# Patient Record
Sex: FEMALE | Race: WHITE | NOT HISPANIC OR LATINO | Employment: OTHER | URBAN - METROPOLITAN AREA
[De-identification: names, ages, dates, MRNs, and addresses within clinical notes are randomized per-mention and may not be internally consistent; named-entity substitution may affect disease eponyms.]

---

## 2022-03-16 ENCOUNTER — HOSPITAL ENCOUNTER (OUTPATIENT)
Dept: RADIOLOGY | Facility: OTHER | Age: 62
Discharge: HOME | End: 2022-03-16
Attending: NURSE PRACTITIONER
Payer: MEDICARE

## 2022-03-16 DIAGNOSIS — J44.9 CHRONIC OBSTRUCTIVE PULMONARY DISEASE, UNSPECIFIED: ICD-10-CM

## 2022-03-16 PROCEDURE — 71046 X-RAY EXAM CHEST 2 VIEWS: CPT | Mod: 26,,, | Performed by: RADIOLOGY

## 2022-03-16 PROCEDURE — 71046 X-RAY EXAM CHEST 2 VIEWS: CPT | Mod: TC,FY

## 2022-03-16 PROCEDURE — 71046 XR CHEST PA AND LATERAL: ICD-10-PCS | Mod: 26,,, | Performed by: RADIOLOGY

## 2022-03-29 ENCOUNTER — OFFICE VISIT (OUTPATIENT)
Dept: PODIATRY | Facility: CLINIC | Age: 62
End: 2022-03-29
Payer: MEDICARE

## 2022-03-29 VITALS
HEIGHT: 65 IN | DIASTOLIC BLOOD PRESSURE: 88 MMHG | WEIGHT: 180 LBS | SYSTOLIC BLOOD PRESSURE: 136 MMHG | BODY MASS INDEX: 29.99 KG/M2 | HEART RATE: 97 BPM

## 2022-03-29 DIAGNOSIS — B35.1 DERMATOPHYTOSIS OF NAIL: Primary | ICD-10-CM

## 2022-03-29 DIAGNOSIS — M20.5X2 OVERLAPPING TOE, ACQUIRED, LEFT: ICD-10-CM

## 2022-03-29 PROCEDURE — 99999 PR PBB SHADOW E&M-EST. PATIENT-LVL III: ICD-10-PCS | Mod: PBBFAC,,, | Performed by: PODIATRIST

## 2022-03-29 PROCEDURE — 99213 OFFICE O/P EST LOW 20 MIN: CPT | Mod: PBBFAC,PN | Performed by: PODIATRIST

## 2022-03-29 PROCEDURE — 99203 PR OFFICE/OUTPT VISIT, NEW, LEVL III, 30-44 MIN: ICD-10-PCS | Mod: S$PBB,,, | Performed by: PODIATRIST

## 2022-03-29 PROCEDURE — 99203 OFFICE O/P NEW LOW 30 MIN: CPT | Mod: S$PBB,,, | Performed by: PODIATRIST

## 2022-03-29 PROCEDURE — 99999 PR PBB SHADOW E&M-EST. PATIENT-LVL III: CPT | Mod: PBBFAC,,, | Performed by: PODIATRIST

## 2022-03-29 RX ORDER — PRENATAL VIT 91/IRON/FOLIC/DHA 28-975-200
COMBINATION PACKAGE (EA) ORAL 2 TIMES DAILY
Qty: 15 G | Refills: 3 | Status: SHIPPED | OUTPATIENT
Start: 2022-03-29

## 2022-03-29 NOTE — PATIENT INSTRUCTIONS
General nail care measures for abnormal nails include:  Keeping nails trimmed short, but avoid overzealous trimming  Avoiding trauma   Avoiding contact irritants   Keeping nails dry (avoiding wet work)  Avoiding all nail cosmetics  Wearing shoes that fit well at the toe box.  Avoid tight shoes.

## 2022-03-29 NOTE — PROGRESS NOTES
"Chief Complaint   Patient presents with    Ingrown Toenail     Ingrown toenail, right foot         MEDICAL DECISION MAKING:       Problem List Items Addressed This Visit    None     Visit Diagnoses     Dermatophytosis of nail    -  Primary    Relevant Medications    terbinafine HCL (LAMISIL) 1 % cream    Overlapping toe, acquired, left                · I counseled the patient on the patient's conditions, their implications and medical management.   · Toe  to keep 2nd toe from overlapping hallux.  Shoe and activity modification as needed for relief.   General nail care measures for abnormal nails include:   Keeping nails trimmed short, but avoid overzealous trimming   Avoiding trauma    Avoiding contact irritants    Keeping nails dry (avoiding wet work)   Avoiding all nail cosmetics   Wearing shoes that fit well at the toe box.  Avoid tight shoes.                   HPI:   Ina Valdes is a 62 y.o. female who presents to clinic with concerns of left hallux fungal, incurvated toenails, present for at least 10 years.  She has tried OTC topical antifungal without significant relief.          There is no problem list on file for this patient.          No current outpatient medications on file prior to visit.     No current facility-administered medications on file prior to visit.            Review of patient's allergies indicates:  Not on File                ROS:    General ROS: negative for - chills, fatigue or fever  Cardiovascular ROS: no chest pain or dyspnea on exertion  Musculoskeletal ROS: negative for - joint pain or joint stiffness   Skin: Negative for rash, Positive for nail or hair changes.  no itching.       EXAM:   Vitals:    03/29/22 1015   BP: 136/88   Pulse: 97   Weight: 81.6 kg (180 lb)   Height: 5' 5" (1.651 m)        General:  alert, no distress, cooperative    Vasc:    Pedal pulses: DP 2/4 - B/L and PT 2/4 - B/L  Capillary Refill Time: Normal - B/L  Temperature: Normal - " B/L  Hair Growth: Normal - B/L  Edema:  Absent bilaterally      Neuro Motor:   Kiel present bilaterally,   Reflexes normal bilaterally,   Tinels absent  Mulders absent      Derm:   Hair:  present  Nails: thickened and yellow, incurvated bilateral borders left hallux toenail, with subungual debris.  The left 2nd toe overlaps on the lateral border of the left hallux nail.   Interdigital Spaces: clean, dry and without evidence of break in skin integrity  Wounds: None        Msk:    tenderness absent    masses absent  range of movement non-painful and equal  crepitation absent bilaterally, strength normal 5/5  Mild left foot bunion,  left 2nd toe overlaps hallux.

## 2022-04-27 ENCOUNTER — HOSPITAL ENCOUNTER (OUTPATIENT)
Dept: RADIOLOGY | Facility: OTHER | Age: 62
Discharge: HOME | End: 2022-04-27
Attending: NURSE PRACTITIONER
Payer: MEDICARE

## 2022-04-27 DIAGNOSIS — Z12.31 ENCOUNTER FOR SCREENING MAMMOGRAM FOR MALIGNANT NEOPLASM OF BREAST: ICD-10-CM

## 2022-04-27 PROCEDURE — 77063 MAMMO DIGITAL SCREENING BILAT WITH TOMO: ICD-10-PCS | Mod: 26,,, | Performed by: RADIOLOGY

## 2022-04-27 PROCEDURE — 77067 SCR MAMMO BI INCL CAD: CPT | Mod: TC

## 2022-04-27 PROCEDURE — 77063 BREAST TOMOSYNTHESIS BI: CPT | Mod: 26,,, | Performed by: RADIOLOGY

## 2022-04-27 PROCEDURE — 77067 MAMMO DIGITAL SCREENING BILAT WITH TOMO: ICD-10-PCS | Mod: 26,,, | Performed by: RADIOLOGY

## 2022-04-27 PROCEDURE — 77067 SCR MAMMO BI INCL CAD: CPT | Mod: 26,,, | Performed by: RADIOLOGY

## 2022-10-03 ENCOUNTER — OFFICE VISIT (OUTPATIENT)
Dept: OBSTETRICS AND GYNECOLOGY | Facility: CLINIC | Age: 62
End: 2022-10-03
Payer: MEDICARE

## 2022-10-03 VITALS
HEIGHT: 65 IN | SYSTOLIC BLOOD PRESSURE: 108 MMHG | DIASTOLIC BLOOD PRESSURE: 62 MMHG | WEIGHT: 174 LBS | BODY MASS INDEX: 28.99 KG/M2

## 2022-10-03 DIAGNOSIS — Z11.51 SCREENING FOR HPV (HUMAN PAPILLOMAVIRUS): ICD-10-CM

## 2022-10-03 DIAGNOSIS — Z12.4 ENCOUNTER FOR PAPANICOLAOU SMEAR FOR CERVICAL CANCER SCREENING: Primary | ICD-10-CM

## 2022-10-03 PROCEDURE — 99999 PR PBB SHADOW E&M-EST. PATIENT-LVL III: CPT | Mod: PBBFAC,,, | Performed by: REGISTERED NURSE

## 2022-10-03 PROCEDURE — G0101 PR CA SCREEN;PELVIC/BREAST EXAM: ICD-10-PCS | Mod: S$PBB,GZ,, | Performed by: REGISTERED NURSE

## 2022-10-03 PROCEDURE — 99999 PR PBB SHADOW E&M-EST. PATIENT-LVL III: ICD-10-PCS | Mod: PBBFAC,,, | Performed by: REGISTERED NURSE

## 2022-10-03 PROCEDURE — 99213 OFFICE O/P EST LOW 20 MIN: CPT | Mod: PBBFAC | Performed by: REGISTERED NURSE

## 2022-10-03 PROCEDURE — 87624 HPV HI-RISK TYP POOLED RSLT: CPT | Performed by: REGISTERED NURSE

## 2022-10-03 PROCEDURE — G0101 CA SCREEN;PELVIC/BREAST EXAM: HCPCS | Mod: S$PBB,GZ,, | Performed by: REGISTERED NURSE

## 2022-10-03 PROCEDURE — 88175 CYTOPATH C/V AUTO FLUID REDO: CPT | Performed by: REGISTERED NURSE

## 2022-10-03 NOTE — PROGRESS NOTES
CC: Annual    HPI: Pt is a 62 y.o.  female who presents for routine annual exam. She is s/p hysterectomy. She had partial hysterectomy due to cervical cancer diagnosed in her 20's. She was referred from her primary care provider for pap due to this. She does not want STD screening; she has not been sexually active in years.  Denies any GYN complaints.  The patient participates in regular exercise: yes, dog walking.  The patient smokes (1/2 to 1 pack per day).  The patient wears seatbelts.   Pt denies any domestic violence.    Last mammogram: 04/2022 WNL  Last colonoscopy: 2 years ago, in [place]     FH:  Breast cancer: none  Colon cancer: none  Ovarian cancer: none  Endometrial cancer: none      ROS:  GENERAL: Feeling well overall.   SKIN: Denies rash or lesions.   HEAD: Denies head injury or headache.   NODES: Denies enlarged lymph nodes.   CHEST: Denies chest pain or shortness of breath.   CARDIOVASCULAR: Denies palpitations or left sided chest pain.   ABDOMEN: No abdominal pain, nausea, vomiting or rectal bleeding.   URINARY: No dysuria or hematuria.  REPRODUCTIVE: See HPI.   BREASTS: Denies pain, lumps, or nipple discharge.   HEMATOLOGIC: No easy bruisability or excessive bleeding.   MUSCULOSKELETAL: Denies joint pain or swelling.   NEUROLOGIC: Denies syncope or weakness.   PSYCHIATRIC: Denies depression.    PE:    APPEARANCE: Well nourished, well developed, in no acute distress.  NODES: No inguinal lymph node enlargement.  ABDOMEN: Soft. No tenderness or masses. No hernias.  BREASTS: Symmetrical, no skin changes or visible lesions. No palpable masses, nipple discharge or adenopathy bilaterally.  PELVIC: Normal external female genitalia without lesions. Normal hair distribution. Adequate perineal body, normal urethral meatus. Vagina without lesions or discharge. No significant cystocele or rectocele. Uterus and cervix surgically absent. Bimanual exam revealed no masses, tenderness or abnormality.  ANUS:  Normal.    Diagnosis:  1. Encounter for Papanicolaou smear for cervical cancer screening    2. Screening for HPV (human papillomavirus)          PLAN:  Pap/HPV co-testing of cervical cuff  Repeat mammogram in 1 year  Repeat colonoscopy as indicated    Patient was counseled today on postmenopausal issues.     Follow-up in 1 year.         HUDSON Bowman       Answers submitted by the patient for this visit:  Gynecologic Exam Questionnaire  (Submitted on 9/29/2022)  Chief Complaint: Gynecologic exam  Pregnant now?: No  abdominal pain: Yes  Vaginal bleeding: no bleeding  Passing clots?: No  Sexual activity: not sexually active  Partner with STD symptoms: no  Birth control: nothing  gynecological surgery: Yes

## 2022-10-07 LAB
CLINICAL INFO: NORMAL
CYTO CVX: NORMAL
CYTOLOGIST CVX/VAG CYTO: NORMAL
CYTOLOGIST CVX/VAG CYTO: NORMAL
CYTOLOGY CMNT CVX/VAG CYTO-IMP: NORMAL
CYTOLOGY PAP THIN PREP EXPLANATION: NORMAL
DATE OF PREVIOUS PAP: NORMAL
DATE PREVIOUS BX: NO
GEN CATEG CVX/VAG CYTO-IMP: NORMAL
HPV E6+E7 MRNA CVX QL NAA+PROBE: NOT DETECTED
LMP START DATE: NORMAL
MICROORGANISM CVX/VAG CYTO: NORMAL
PATHOLOGIST CVX/VAG CYTO: NORMAL
SERVICE CMNT-IMP: NORMAL
SPECIMEN SOURCE CVX/VAG CYTO: NORMAL
STAT OF ADQ CVX/VAG CYTO-IMP: NORMAL

## 2022-10-14 ENCOUNTER — HOSPITAL ENCOUNTER (OUTPATIENT)
Dept: RADIOLOGY | Facility: OTHER | Age: 62
Discharge: HOME | End: 2022-10-14
Attending: NURSE PRACTITIONER
Payer: MEDICARE

## 2022-10-14 DIAGNOSIS — R10.11 RIGHT UPPER QUADRANT PAIN: ICD-10-CM

## 2022-10-14 PROCEDURE — 76705 ECHO EXAM OF ABDOMEN: CPT | Mod: TC

## 2022-10-14 PROCEDURE — 76705 ECHO EXAM OF ABDOMEN: CPT | Mod: 26,,, | Performed by: RADIOLOGY

## 2022-10-14 PROCEDURE — 76705 US ABDOMEN LIMITED: ICD-10-PCS | Mod: 26,,, | Performed by: RADIOLOGY

## 2022-11-02 ENCOUNTER — IMMUNIZATION (OUTPATIENT)
Dept: PHARMACY | Facility: CLINIC | Age: 62
End: 2022-11-02
Payer: MEDICARE

## 2022-11-02 DIAGNOSIS — Z23 NEED FOR VACCINATION: Primary | ICD-10-CM

## 2022-11-03 ENCOUNTER — HOSPITAL ENCOUNTER (OUTPATIENT)
Dept: RADIOLOGY | Facility: OTHER | Age: 62
Discharge: HOME | End: 2022-11-03
Attending: NURSE PRACTITIONER
Payer: MEDICARE

## 2022-11-03 DIAGNOSIS — N13.30 UNSPECIFIED HYDRONEPHROSIS: ICD-10-CM

## 2022-11-03 PROCEDURE — 76770 US EXAM ABDO BACK WALL COMP: CPT | Mod: TC

## 2022-11-03 PROCEDURE — 76770 US KIDNEY: ICD-10-PCS | Mod: 26,,, | Performed by: RADIOLOGY

## 2022-11-03 PROCEDURE — 76770 US EXAM ABDO BACK WALL COMP: CPT | Mod: 26,,, | Performed by: RADIOLOGY

## 2024-07-30 ENCOUNTER — DASHBOARD ENCOUNTERS (OUTPATIENT)
Age: 64
End: 2024-07-30

## 2024-08-01 ENCOUNTER — OFFICE VISIT (OUTPATIENT)
Dept: RURAL CLINIC 6 | Facility: CLINIC | Age: 64
End: 2024-08-01

## 2024-08-01 RX ORDER — MUPIROCIN 20 MG/G
1 APPLICATION OINTMENT TOPICAL TWICE A DAY
Status: ACTIVE | COMMUNITY

## 2024-08-01 RX ORDER — CLOTRIMAZOLE 10 MG/G
1 APPLICATION CREAM TOPICAL TWICE A DAY
Status: ACTIVE | COMMUNITY

## 2024-08-01 RX ORDER — HYDROXYZINE PAMOATE 25 MG/1
1 CAPSULE AT BEDTIME AS NEEDED CAPSULE ORAL ONCE A DAY
Status: ACTIVE | COMMUNITY

## 2025-08-07 ENCOUNTER — OFFICE VISIT (OUTPATIENT)
Dept: RURAL CLINIC 6 | Facility: CLINIC | Age: 65
End: 2025-08-07
Payer: COMMERCIAL

## 2025-08-07 DIAGNOSIS — Z86.0100 PERSONAL HISTORY OF COLONIC POLYPS: ICD-10-CM

## 2025-08-07 DIAGNOSIS — R10.31 PAIN, ABDOMINAL, RLQ: ICD-10-CM

## 2025-08-07 DIAGNOSIS — R10.13 DYSPEPSIA: ICD-10-CM

## 2025-08-07 DIAGNOSIS — R91.8 LUNG MASS: ICD-10-CM

## 2025-08-07 PROCEDURE — 99204 OFFICE O/P NEW MOD 45 MIN: CPT | Performed by: PHYSICIAN ASSISTANT

## 2025-08-07 PROCEDURE — 99244 OFF/OP CNSLTJ NEW/EST MOD 40: CPT | Performed by: PHYSICIAN ASSISTANT

## 2025-08-07 RX ORDER — CLOTRIMAZOLE 1% ANTIFUNGAL CREAM 1 G/100G
1 APPLICATION CREAM TOPICAL TWICE A DAY
Status: ACTIVE | COMMUNITY

## 2025-08-07 RX ORDER — BUDESONIDE AND FORMOTEROL FUMARATE DIHYDRATE 80; 4.5 UG/1; UG/1
AS DIRECTED AEROSOL RESPIRATORY (INHALATION)
Status: ACTIVE | COMMUNITY

## 2025-08-07 RX ORDER — HYDROXYZINE PAMOATE 25 MG/1
1 CAPSULE AT BEDTIME AS NEEDED CAPSULE ORAL ONCE A DAY
Status: ACTIVE | COMMUNITY

## 2025-08-07 RX ORDER — OMEPRAZOLE 40 MG/1
1 CAPSULE 1/2 TO 1 HOUR BEFORE MORNING MEAL CAPSULE, DELAYED RELEASE ORAL ONCE A DAY
Status: ACTIVE | COMMUNITY

## 2025-08-07 RX ORDER — SUCRALFATE 1 G/1
1 TABLET ON AN EMPTY STOMACH TABLET ORAL TWICE A DAY
Status: ACTIVE | COMMUNITY

## 2025-08-07 RX ORDER — MUPIROCIN 20 MG/G
1 APPLICATION OINTMENT TOPICAL TWICE A DAY
Status: ACTIVE | COMMUNITY

## 2025-08-25 ENCOUNTER — TELEPHONE ENCOUNTER (OUTPATIENT)
Dept: RURAL CLINIC 2 | Facility: CLINIC | Age: 65
End: 2025-08-25

## 2025-08-27 ENCOUNTER — TELEPHONE ENCOUNTER (OUTPATIENT)
Dept: RURAL CLINIC 6 | Facility: CLINIC | Age: 65
End: 2025-08-27

## 2025-08-28 ENCOUNTER — OFFICE VISIT (OUTPATIENT)
Dept: RURAL MEDICAL CENTER 4 | Facility: MEDICAL CENTER | Age: 65
End: 2025-08-28

## 2025-08-28 RX ORDER — OMEPRAZOLE 40 MG/1
1 CAPSULE 1/2 TO 1 HOUR BEFORE MORNING MEAL CAPSULE, DELAYED RELEASE ORAL ONCE A DAY
Status: ACTIVE | COMMUNITY

## 2025-08-28 RX ORDER — SUCRALFATE 1 G/1
1 TABLET ON AN EMPTY STOMACH TABLET ORAL TWICE A DAY
Status: ACTIVE | COMMUNITY

## 2025-08-28 RX ORDER — CLOTRIMAZOLE 1% ANTIFUNGAL CREAM 1 G/100G
1 APPLICATION CREAM TOPICAL TWICE A DAY
Status: ACTIVE | COMMUNITY

## 2025-08-28 RX ORDER — BUDESONIDE AND FORMOTEROL FUMARATE DIHYDRATE 80; 4.5 UG/1; UG/1
AS DIRECTED AEROSOL RESPIRATORY (INHALATION)
Status: ACTIVE | COMMUNITY

## 2025-08-28 RX ORDER — MUPIROCIN 20 MG/G
1 APPLICATION OINTMENT TOPICAL TWICE A DAY
Status: ACTIVE | COMMUNITY

## 2025-08-28 RX ORDER — HYDROXYZINE PAMOATE 25 MG/1
1 CAPSULE AT BEDTIME AS NEEDED CAPSULE ORAL ONCE A DAY
Status: ACTIVE | COMMUNITY